# Patient Record
Sex: MALE | Race: WHITE
[De-identification: names, ages, dates, MRNs, and addresses within clinical notes are randomized per-mention and may not be internally consistent; named-entity substitution may affect disease eponyms.]

---

## 2021-07-26 ENCOUNTER — HOSPITAL ENCOUNTER (EMERGENCY)
Dept: HOSPITAL 56 - MW.ED | Age: 21
Discharge: HOME | End: 2021-07-26
Payer: SELF-PAY

## 2021-07-26 DIAGNOSIS — J01.10: Primary | ICD-10-CM

## 2021-07-26 DIAGNOSIS — J39.9: ICD-10-CM

## 2021-07-26 DIAGNOSIS — Z20.822: ICD-10-CM

## 2021-07-26 LAB
FLUAV RNA UPPER RESP QL NAA+PROBE: NEGATIVE
FLUBV RNA UPPER RESP QL NAA+PROBE: NEGATIVE
SARS-COV-2 RNA RESP QL NAA+PROBE: NEGATIVE

## 2021-07-26 PROCEDURE — 96372 THER/PROPH/DIAG INJ SC/IM: CPT

## 2021-07-26 PROCEDURE — 99283 EMERGENCY DEPT VISIT LOW MDM: CPT

## 2021-07-26 PROCEDURE — 71045 X-RAY EXAM CHEST 1 VIEW: CPT

## 2021-07-26 PROCEDURE — 0240U: CPT

## 2021-07-26 NOTE — CR
INDICATION:



Cough.



TECHNIQUE:



AP portable chest x-ray 2 views.



FINDINGS:



The heart size normal. No focal infiltrate or consolidation in either lung. 

Small amount of triangular-shaped opacity in the left lung base along the 

lower heart border and hemidiaphragm may be related to atelectasis. Mild 

thoracic curve. Mild soft tissue prominence in the left suprahilar region 

could be related overlapping prominent vascular shadows which would be 

favored over mild lymph node prominence. Chest otherwise negative without 

acute disease.



Dictated by Micheal Bradley MD @ 7/26/2021 9:45:32 PM



Signed by Dr. Micheal Bradley @ Jul 26 2021  9:45PM

## 2021-07-26 NOTE — EDM.PDOC
ED HPI GENERAL MEDICAL PROBLEM





- General


Chief Complaint: Respiratory Problem


Stated Complaint: RUNNY NOSE, HEADACHE, COUGH


Time Seen by Provider: 07/26/21 20:23


Source of Information: Reports: Patient


History Limitations: Reports: No Limitations





- History of Present Illness


INITIAL COMMENTS - FREE TEXT/NARRATIVE: 





21M no PMHx presents for 4 days of worsening sinus congestion, sore throat, non-

productive cough, HA, and body aches. No fevers noted at home. Still has sense 

of taste, no sense of smell. Has not had COVID or vaccine. 


  ** Nose


Pain Score (Numeric/FACES): 7





- Related Data


                                    Allergies











Allergy/AdvReac Type Severity Reaction Status Date / Time


 


No Known Allergies Allergy   Verified 07/26/21 20:22











Home Meds: 


                                    Home Meds





Amoxicillin/Potassium Clav [Augmentin 875-125 Tablet] 1 each PO BID 7 Days #14 

tablet 07/26/21 [Rx]


Pseudoephedrine HCl [Sudafed 24-Hour] 240 mg PO DAILY PRN #14 tab.er.24h 

07/26/21 [Rx]











ED ROS GENERAL





- Review of Systems


Review Of Systems: Comprehensive ROS is negative, except as noted in HPI.





ED EXAM, GENERAL





- Physical Exam


Exam: See Below


Exam Limited By: No Limitations


General Appearance: Alert, WD/WN, No Apparent Distress


Ears: Hearing Grossly Normal


Nose: Normal Inspection


Throat/Mouth: Normal Inspection, Normal Oropharynx, Normal Voice, No Airway 

Compromise


Head: Atraumatic, Normocephalic


Neck: Normal Inspection


Respiratory/Chest: No Respiratory Distress, Lungs Clear, Normal Breath Sounds, 

No Accessory Muscle Use


Cardiovascular: Normal Peripheral Pulses, Regular Rate, Rhythm


Extremities: Normal Inspection


Neurological: Alert


Psychiatric: Normal Affect, Normal Mood


Skin Exam: Warm, Dry, Intact, Normal Color





Course





- Vital Signs


Last Recorded V/S: 


                                Last Vital Signs











Temp  98.3 F   07/26/21 20:45


 


Pulse  14 L  07/26/21 20:22


 


Resp  14   07/26/21 20:22


 


BP  129/72   07/26/21 20:22


 


Pulse Ox  97   07/26/21 20:22














- Orders/Labs/Meds


Labs: 


                                Laboratory Tests











  07/26/21 Range/Units





  21:00 


 


Influenza Type A RNA  NEGATIVE  (NEGATIVE)  


 


Influenza Type B RNA  NEGATIVE  (NEGATIVE)  


 


SARS-CoV-2 RNA (VEENA)  NEGATIVE  (NEGATIVE)  











Meds: 


Medications














Discontinued Medications














Generic Name Dose Route Start Last Admin





  Trade Name Magdielq  PRN Reason Stop Dose Admin


 


Acetaminophen  1,000 mg  07/26/21 20:34  07/26/21 20:45





  Acetaminophen 500 Mg Tab  PO  07/26/21 20:35  1,000 mg





  ONETIME ONE   Administration


 


Ketorolac Tromethamine  30 mg  07/26/21 20:34  07/26/21 20:44





  Ketorolac 30 Mg/Ml Sdv  IM  07/26/21 20:35  30 mg





  ONETIME ONE   Administration


 


Pseudoephedrine HCl  30 mg  07/26/21 20:34  07/26/21 21:26





  Pseudoephedrine 30 Mg Tab  PO  07/26/21 20:35  30 mg





  ONETIME ONE   Administration














- Re-Assessments/Exams


Free Text/Narrative Re-Assessment/Exam: 





07/26/21 20:36


Will get COVID and influenza swabbing. Will get CXR. Will treat symptomatically 

with toradol, tylenol, sudafed


07/26/21 22:06


Chest x-ray, Covid, influenza swabbing is all negative.  Will discharge patient 

with symptomatic relief medications and augmentin for sinusitis





Departure





- Departure


Time of Disposition: 22:07


Disposition: Home, Self-Care 01


Condition: Good


Clinical Impression: 


 Upper respiratory disease





Sinusitis


Qualifiers:


 Sinusitis location: frontal Chronicity: acute Recurrence: non-recurrent 

Qualified Code(s): J01.10 - Acute frontal sinusitis, unspecified








- Discharge Information


Prescriptions: 


Amoxicillin/Potassium Clav [Augmentin 875-125 Tablet] 1 each PO BID 7 Days #14 

tablet


Pseudoephedrine HCl [Sudafed 24-Hour] 240 mg PO DAILY PRN #14 tab.er.24h


 PRN Reason: Congestion


Instructions:  Sinusitis, Adult


Referrals: 


PCP,None [Primary Care Provider] - 


Forms:  ED Department Discharge


Additional Instructions: 


The following information is given to patients seen in the emergency department 

who are being discharged to home. This information is to outline your options 

for follow-up care. We provide all patients seen in our emergency department 

with a follow-up referral.





The need for follow-up, as well as the timing and circumstances, are variable 

depending upon the specifics of your emergency department visit.





If you don't have a primary care physician on staff, we will provide you with a 

referral. We always advise you to contact your personal physician following an 

emergency department visit to inform them of the circumstance of the visit and 

for follow-up with them and/or the need for any referrals to a consulting 

specialist.





The emergency department will also refer you to a specialist when appropriate. 

This referral assures that you have the opportunity for follow-up care with a 

specialist. All of these measure are taken in an effort to provide you with 

optimal care, which includes your follow-up.





Under all circumstances we always encourage you to contact your private 

physician who remains a resource for coordinating your care. When calling for 

follow-up care, please make the office aware that this follow-up is from your 

recent emergency room visit. If for any reason you are refused follow-up, please

contact the [facility] Emergency Department

at [phone] and asked to speak to the emergency department charge nurse.





Please follow up with your primary care physician. If you do not have a primary 

care physician, see below:


[facility]


[address] [place] [id number] [phone]





[facility]


[address] [place] [address] [phone]








[facility]


[address] [place] [address]


Phone: [phone]


Fax: [phone]








Sepsis Event Note (ED)





- Evaluation


Sepsis Screening Result: No Definite Risk





- Focused Exam


Vital Signs: 


                                   Vital Signs











  Temp Temp Pulse Resp BP Pulse Ox


 


 07/26/21 20:45  98.3 F     


 


 07/26/21 20:22   98.3 F  14 L  14  129/72  97

## 2021-07-29 ENCOUNTER — HOSPITAL ENCOUNTER (EMERGENCY)
Dept: HOSPITAL 56 - MW.ED | Age: 21
Discharge: HOME | End: 2021-07-29
Payer: SELF-PAY

## 2021-07-29 DIAGNOSIS — J01.10: Primary | ICD-10-CM

## 2021-07-29 NOTE — EDM.PDOC
ED HPI GENERAL MEDICAL PROBLEM





- General


Chief Complaint: ENT Problem


Stated Complaint: SINUSES


Time Seen by Provider: 07/29/21 14:42


Source of Information: Reports: Patient


History Limitations: Reports: No Limitations





- History of Present Illness


INITIAL COMMENTS - FREE TEXT/NARRATIVE: 


HISTORY AND PHYSICAL:





History of present illness:


Patient is a 21-year-old male who presents to the emergency room with complaints

of sinus pressure, nasal drainage, cough and sinus headache.  States he has had 

symptoms for approximately 6 days.  He was seen in the emergency room on 

7/26/2021 and was prescribed Sudafed and what he thought was Tylenol.  Reviewing

the chart it is noted he is prescribed Sudafed and Augmentin.  Unsure if patient

has been taking the Augmentin correctly.  He states he was tested for COVID-19 

at that time, was negative.  Patient denies any fever, chills, change in vision,

syncope or near syncope. Denies any chest pain, back pain, shortness of breath 

or cough. Denies any abdominal pain, nausea, vomiting, diarrhea, constipation or

dysuria. Has not noted any blood in urine or stool. Patient has been eating and 

drinking appropriately.





Review of systems: 


As per history of present illness and below otherwise all systems reviewed and 

negative.





Past medical history: 


As per history of present illness and as reviewed below otherwise 

noncontributory.





Surgical history: 


As per history of present illness and as reviewed below otherwise 

noncontributory.





Social history: 


See social history for further information





Family history: 


As per history of present illness and as reviewed below otherwise 

noncontributory.





Physical exam:


General: Well developed and well nourished. Alert and orientated x 3. Nontoxic 

in appearance and in no acute distress. Vital signs are stable and have been 

reviewed by me. Nursing notes were reviewed. 


HEENT: Atraumatic, normocephalic, pupils equal and reactive bilaterally, 

negative for conjunctival pallor or scleral icterus, mucous membranes moist, 

right TM erythematous at the 2 o'clock position with good light reflex and no 

bulging, left TM normal normal bilaterally, throat clear, neck supple, 

nontender, trachea midline.  Bilateral maxillary sinus tenderness to palpation. 

Nasal drainage is noted.  No drooling or trismus noted. No meningeal signs. No 

hot potato voice noted. 


Lungs: Clear to auscultation bilaterally. No wheezes, rales, or rhonchi. Chest 

nontender. Normal work of breathing, no accessory muscles used.


Heart: S1S2, regular rate and rhythm without overt murmur, gallops, or rubs. No 

JVD. No peripheral edema


Abdomen: Soft, nondistended, nontender. Normoactive bowel sounds. Negative for 

masses or costovertebral tenderness.


Skin: Intact, warm, dry. No lesions or rashes noted.


Hematologic: No petechiae or purpra. Mucosa appropriate color and normal nail 

bed color and refill.


Extremities: Atraumatic, moves all extremities per self without difficulty or 

deficits, negative for cords or calf pain. Neurovascular unremarkable.


Neuro: Awake, alert, oriented. Cranial nerves II through XII unremarkable. 

Cerebellum unremarkable. Motor and sensory unremarkable throughout. Exam 

nonfocal.


Psychiatric: Mood and affect are appropriate.  Normal thought process. Answering

questions appropriately.





Notes:


*This patient was seen and evaluated during the 2020 SARS-CoV-2 novel 

coronavirus pandemic period.  Community viral transmission is ongoing at time of

this encounter and the emergency department is operating under pandemic response

procedures.





Patient is fairly sure that he did not  the Augmentin and in fact was 

just Tylenol and Sudafed.  He will double check at home.  I have represcribed 

the Augmentin for him along with home care measures.  I have talked with the 

patient about today's findings, in addition to providing specific details for 

plan of care.  Reassessment at the time of disposition demonstrates that the 

patient is in no acute distress.  The patient is stable for discharge, 

counseling was provided and we discussed in great detail signs and symptoms that

would prompt them to return to the Emergency Department. Medication, follow up 

and supportive care measures were reviewed and discussed. Voices understanding 

and is agreeable to plan of care. Denies any further questions or concerns at 

this time.





Diagnostics:


None





Therapeutics:


None





Prescription:


Augmentin





Impression: 


Sinusitis





Plan:


1.  You were evaluated today on an emergent basis. You were prescribed 

Augmentin, which is the correct antibiotic for sinus/respiratory infection. 

Please double check your medication at home to see what you're taking. I have 

re-prescribed this, if it wasn't filled. 


2.  You can alternate Tylenol and ibuprofen as needed for pain and fever 

management.


3. We encourage you to follow up with your primary care provider and/or 

recommended specialist in the next few days for re-evaluation and further 

care/management. 


4. If your symptoms should worsen, new symptoms develop or any of the signs and 

symptoms we discussed should arise please return to the emergency room or call 

[phone] (if needed).





Definitive disposition and diagnosis as appropriate pending reevaluation and 

review of above.





  ** Coughing


Pain Score (Numeric/FACES): 6





- Related Data


                                    Allergies











Allergy/AdvReac Type Severity Reaction Status Date / Time


 


No Known Allergies Allergy   Verified 07/29/21 15:22











Home Meds: 


                                    Home Meds





Amoxicillin/Clavulanate K [Augmentin 875-125 MG] 1 tab PO BID 10 Days #20 tablet

07/29/21 [Rx]


Amoxicillin/Potassium Clav [Augmentin 875-125 Tablet] 1 tab PO DAILY 07/29/21 

[History]


Naproxen Sodium/Pseudoephedrin [Sudafed Sinus 12Hr Pressr-Pain] 1 tab PO DAILY 

07/29/21 [History]











Past Medical History





- Past Health History


Medical/Surgical History: Denies Medical/Surgical History





- Infectious Disease History


Infectious Disease History: Reports: None





Social & Family History





- Family History


Family Medical History: No Pertinent Family History





- Caffeine Use


Caffeine Use: Reports: Coffee, Energy Drinks





ED ROS ENT





- Review of Systems


Review Of Systems: Comprehensive ROS is negative, except as noted in HPI.





ED EXAM, ENT





- Physical Exam


Exam: See Below (See dictation)





Course





- Vital Signs


Last Recorded V/S: 


                                Last Vital Signs











Temp  97.2 F   07/29/21 15:24


 


Pulse  90   07/29/21 15:24


 


Resp  17   07/29/21 15:24


 


BP  118/70   07/29/21 15:24


 


Pulse Ox  96   07/29/21 15:24














Departure





- Departure


Time of Disposition: 15:29


Disposition: Home, Self-Care 01


Clinical Impression: 


Sinusitis


Qualifiers:


 Sinusitis location: frontal Chronicity: acute Recurrence: non-recurrent 

Qualified Code(s): J01.10 - Acute frontal sinusitis, unspecified








- Discharge Information


Prescriptions: 


Amoxicillin/Clavulanate K [Augmentin 875-125 MG] 1 tab PO BID 10 Days #20 tablet


Instructions:  Sinusitis, Adult, Easy-to-Read


Referrals: 


PCP,None [Primary Care Provider] - 


Forms:  ED Department Discharge


Additional Instructions: 


The following information is given to patients seen in the emergency department 

who are being discharged to home. This information is to outline your options 

for follow-up care. We provide all patients seen in our emergency department 

with a follow-up referral.





The need for follow-up, as well as the timing and circumstances, are variable 

depending upon the specifics of your emergency department visit.





If you don't have a primary care physician on staff, we will provide you with a 

referral. We always advise you to contact your personal physician following an 

emergency department visit to inform them of the circumstance of the visit and 

for follow-up with them and/or the need for any referrals to a consulting 

specialist.





The emergency department will also refer you to a specialist when appropriate. 

This referral assures that you have the opportunity for follow-up care with a 

specialist. All of these measure are taken in an effort to provide you with 

optimal care, which includes your follow-up.





Under all circumstances we always encourage you to contact your private 

physician who remains a resource for coordinating your care. When calling for 

follow-up care, please make the office aware that this follow-up is from your 

recent emergency room visit. If for any reason you are refused follow-up, please

contact the [facility] Emergency Department

at [phone] and asked to speak to the emergency department charge nurse.





[facility]


Primary Care


[address] [place] [address]


Phone: [phone]


Fax: [phone]





[address] [address]


Phone: [phone]


Fax: [phone]





Thank you for choosing the [facility] emergency department in 

[place] for your medical needs today.  It was a pleasure caring for you. Today

you were seen in the emergency department for sinus infection.





1.  You were evaluated today on an emergent basis. You were prescribed 

Augmentin, which is the correct antibiotic for sinus/respiratory infection. 

Please double check your medication at home to see what you're taking. I have 

re-prescribed this, if it wasn't filled. 


2.  You can alternate Tylenol and ibuprofen as needed for pain and fever 

management.


3. We encourage you to follow up with your primary care provider and/or 

recommended specialist in the next few days for re-evaluation and further 

care/management. 


4. If your symptoms should worsen, new symptoms develop or any of the signs and 

symptoms we discussed should arise please return to the emergency room or call 

[phone] (if needed).





Sepsis Event Note (ED)





- Focused Exam


Vital Signs: 


                                   Vital Signs











  Temp Pulse Resp BP Pulse Ox


 


 07/29/21 15:24  97.2 F  90  17  118/70  96

## 2022-11-22 ENCOUNTER — HOSPITAL ENCOUNTER (EMERGENCY)
Dept: HOSPITAL 56 - MW.ED | Age: 22
Discharge: HOME | End: 2022-11-22
Payer: COMMERCIAL

## 2022-11-22 DIAGNOSIS — R07.9: Primary | ICD-10-CM

## 2022-11-22 DIAGNOSIS — Z20.822: ICD-10-CM

## 2022-11-22 DIAGNOSIS — B34.9: ICD-10-CM

## 2022-11-22 LAB
BUN SERPL-MCNC: 11 MG/DL (ref 7–18)
CHLORIDE SERPL-SCNC: 102 MMOL/L (ref 98–107)
CO2 SERPL-SCNC: 28.2 MMOL/L (ref 21–32)
EGFRCR SERPLBLD CKD-EPI 2021: 88 ML/MIN (ref 60–?)
FLUAV RNA UPPER RESP QL NAA+PROBE: NEGATIVE
FLUBV RNA UPPER RESP QL NAA+PROBE: NEGATIVE
GLUCOSE SERPL-MCNC: 116 MG/DL (ref 74–106)
POTASSIUM SERPL-SCNC: 4 MMOL/L (ref 3.5–5.1)
SARS-COV-2 RNA RESP QL NAA+PROBE: NEGATIVE
SODIUM SERPL-SCNC: 141 MMOL/L (ref 136–148)

## 2022-11-22 PROCEDURE — 93005 ELECTROCARDIOGRAM TRACING: CPT

## 2022-11-22 PROCEDURE — 85610 PROTHROMBIN TIME: CPT

## 2022-11-22 PROCEDURE — 71045 X-RAY EXAM CHEST 1 VIEW: CPT

## 2022-11-22 PROCEDURE — 0240U: CPT

## 2022-11-22 PROCEDURE — 83735 ASSAY OF MAGNESIUM: CPT

## 2022-11-22 PROCEDURE — 96361 HYDRATE IV INFUSION ADD-ON: CPT

## 2022-11-22 PROCEDURE — 85025 COMPLETE CBC W/AUTO DIFF WBC: CPT

## 2022-11-22 PROCEDURE — 80053 COMPREHEN METABOLIC PANEL: CPT

## 2022-11-22 PROCEDURE — 96374 THER/PROPH/DIAG INJ IV PUSH: CPT

## 2022-11-22 PROCEDURE — 87634 RSV DNA/RNA AMP PROBE: CPT

## 2022-11-22 PROCEDURE — 99285 EMERGENCY DEPT VISIT HI MDM: CPT

## 2022-11-22 PROCEDURE — 84484 ASSAY OF TROPONIN QUANT: CPT

## 2022-11-22 PROCEDURE — 36415 COLL VENOUS BLD VENIPUNCTURE: CPT

## 2022-11-25 ENCOUNTER — HOSPITAL ENCOUNTER (EMERGENCY)
Dept: HOSPITAL 56 - MW.ED | Age: 22
Discharge: HOME | End: 2022-11-25
Payer: COMMERCIAL

## 2022-11-25 DIAGNOSIS — G44.209: ICD-10-CM

## 2022-11-25 DIAGNOSIS — G43.909: Primary | ICD-10-CM

## 2022-11-25 DIAGNOSIS — J01.10: ICD-10-CM

## 2022-11-25 DIAGNOSIS — B34.9: ICD-10-CM

## 2022-11-25 PROCEDURE — 96374 THER/PROPH/DIAG INJ IV PUSH: CPT

## 2022-11-25 PROCEDURE — 99283 EMERGENCY DEPT VISIT LOW MDM: CPT

## 2022-11-25 PROCEDURE — 96375 TX/PRO/DX INJ NEW DRUG ADDON: CPT

## 2022-11-25 PROCEDURE — 96361 HYDRATE IV INFUSION ADD-ON: CPT
